# Patient Record
Sex: MALE | Race: WHITE | NOT HISPANIC OR LATINO | Employment: FULL TIME | URBAN - METROPOLITAN AREA
[De-identification: names, ages, dates, MRNs, and addresses within clinical notes are randomized per-mention and may not be internally consistent; named-entity substitution may affect disease eponyms.]

---

## 2017-10-05 ENCOUNTER — TELEPHONE (OUTPATIENT)
Dept: SPORTS MEDICINE | Facility: CLINIC | Age: 22
End: 2017-10-05

## 2017-10-05 NOTE — TELEPHONE ENCOUNTER
HIS FATHER CALL REQUESTING A COPY OF MINAL'S IMMUNIZATION RECORDS. I WILL HAVE RECORDS IN STORAGE SENT TO US TO BE ABLE TO UPDATE THEM IN EPIC

## 2017-10-10 ENCOUNTER — TELEPHONE (OUTPATIENT)
Dept: SPORTS MEDICINE | Facility: CLINIC | Age: 22
End: 2017-10-10

## 2017-10-10 NOTE — TELEPHONE ENCOUNTER
I called and left voice message informing the father we have no immunization records on this patient

## 2017-12-20 ENCOUNTER — OFFICE VISIT (OUTPATIENT)
Dept: SPORTS MEDICINE | Facility: CLINIC | Age: 22
End: 2017-12-20

## 2017-12-20 VITALS
HEIGHT: 67 IN | WEIGHT: 121 LBS | HEART RATE: 99 BPM | DIASTOLIC BLOOD PRESSURE: 82 MMHG | OXYGEN SATURATION: 98 % | BODY MASS INDEX: 18.99 KG/M2 | SYSTOLIC BLOOD PRESSURE: 122 MMHG

## 2017-12-20 DIAGNOSIS — Z00.00 PREVENTATIVE HEALTH CARE: Primary | ICD-10-CM

## 2017-12-20 DIAGNOSIS — S76.011A PSOAS MUSCLE STRAIN, RIGHT, INITIAL ENCOUNTER: ICD-10-CM

## 2017-12-20 PROCEDURE — 99395 PREV VISIT EST AGE 18-39: CPT | Performed by: FAMILY MEDICINE

## 2017-12-20 NOTE — PROGRESS NOTES
Chevy Samuel is here today for an annual physical exam.     Eating a healthy diet. Exercising routinely.     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  At the time of the injury patient was carrying folding chairs, walking forward, chairs resting mostly on the left anterior hip which caused a slight extension of the right hip.  No acute injury but was sore afterwards.  Has improved somewhat but there is still some discomfort in the right lower quadrant into the groin especially with lumbar spine extension or extension of the hip.    I have reviewed the patient's medical, family, and social history in detail and updated the computerized patient record.    Screening history:  Colonoscopy - n/a  Prostate - n/a  Metabolic - last year    Health Maintenance   Topic Date Due   • TDAP/TD VACCINES (1 - Tdap) 08/10/2012   • INFLUENZA VACCINE  08/01/2017       Review of Systems   Constitutional: Negative for activity change, appetite change, chills, diaphoresis, fatigue, fever and unexpected weight change.   HENT: Negative for congestion, ear pain, postnasal drip, rhinorrhea, sinus pressure, sneezing, sore throat and trouble swallowing.    Eyes: Negative for visual disturbance.   Respiratory: Negative for cough, chest tightness, shortness of breath and wheezing.    Cardiovascular: Negative for chest pain and palpitations.   Gastrointestinal: Negative for abdominal pain, blood in stool, nausea and vomiting.   Endocrine: Negative for cold intolerance, polydipsia, polyphagia and polyuria.   Genitourinary: Negative for dysuria, flank pain, frequency, hematuria and urgency.   Musculoskeletal: Negative for arthralgias, back pain, joint swelling and myalgias.        Per history of present illness   Skin: Negative for rash.   Allergic/Immunologic: Negative for environmental allergies.   Neurological: Negative for dizziness, syncope,  "weakness, numbness and headaches.   Hematological: Negative for adenopathy. Does not bruise/bleed easily.   Psychiatric/Behavioral: Negative for agitation, decreased concentration, dysphoric mood, sleep disturbance and suicidal ideas. The patient is not nervous/anxious.        /82  Pulse 99  Ht 170.2 cm (67\")  Wt 54.9 kg (121 lb)  SpO2 98%  BMI 18.95 kg/m2     Physical Exam    Vital signs reviewed.  General appearance: No acute distress  Eyes: conjunctiva clear without erythema; pupils equally round and reactive  ENT: external ears and nose normal; hearing normal, oropharynx clear  Neck: supple; no thyromegaly  CV: normal rate and rhythm; no peripheral edema  Respiratory: normal respiratory effort; lungs clear to auscultation bilaterally  MSK: normal gait and station; no focal joint deformity or swelling, right hip full painless range of motion however there is some discomfort in the right lower quadrant and groin region with resisted hip flexion.  Skin: no rash or wounds; normal turgor  Neuro: cranial nerves 2-12 grossly intact; normal sensation to light touch  Psych: mood and affect normal; recent and remote memory intact  Abdomen: Positive bowel sounds soft nontender nondistended.  Patient has some discomfort in the right lower quadrant into the groin with hip extension and resisted hip flexion.  No evidence of hernias.  No visits with results within 2 Week(s) from this visit.  Latest known visit with results is:    Hospital Outpatient Visit on 12/23/2016   Component Date Value Ref Range Status   • BSA 12/23/2016 1.7  m^2 Preliminary   • IVSd 12/23/2016 0.96  cm Preliminary   • LVIDd 12/23/2016 4.7  cm Preliminary   • LVIDs 12/23/2016 2.5  cm Preliminary   • LVPWd 12/23/2016 0.87  cm Preliminary   • IVS/LVPW 12/23/2016 1.1   Preliminary   • FS 12/23/2016 46.5  % Preliminary   • EDV(Teich) 12/23/2016 103.6  ml Preliminary   • ESV(Teich) 12/23/2016 23.0  ml Preliminary   • EF(Teich) 12/23/2016 77.8  % " Preliminary   • EF(cubed) 12/23/2016 84.7  % Preliminary   • LV mass(C)d 12/23/2016 147.2  grams Preliminary   • LV mass(C)dI 12/23/2016 87.4  grams/m^2 Preliminary   • SV(Teich) 12/23/2016 80.7  ml Preliminary   • SI(Teich) 12/23/2016 47.9  ml/m^2 Preliminary   • SV(cubed) 12/23/2016 89.3  ml Preliminary   • SI(cubed) 12/23/2016 53.0  ml/m^2 Preliminary   • Ao root diam 12/23/2016 2.8  cm Preliminary   • Ao root area 12/23/2016 6.3  cm^2 Preliminary   • ACS 12/23/2016 1.9  cm Preliminary   • LVOT diam 12/23/2016 2.0  cm Preliminary   • LVOT area 12/23/2016 3.1  cm^2 Preliminary   • LVOT area(traced) 12/23/2016 3.1  cm^2 Preliminary   • RVOT diam 12/23/2016 2.2  cm Preliminary   • RVOT area 12/23/2016 3.7  cm^2 Preliminary   • LVLd ap4 12/23/2016 6.5  cm Preliminary   • EDV(MOD-sp4) 12/23/2016 52.0  ml Preliminary   • LVLs ap4 12/23/2016 6.2  cm Preliminary   • ESV(MOD-sp4) 12/23/2016 20.0  ml Preliminary   • EF(MOD-sp4) 12/23/2016 61.5  % Preliminary   • LVLd ap2 12/23/2016 7.5  cm Preliminary   • EDV(MOD-sp2) 12/23/2016 60.0  ml Preliminary   • LVLs ap2 12/23/2016 6.7  cm Preliminary   • ESV(MOD-sp2) 12/23/2016 24.0  ml Preliminary   • EF(MOD-sp2) 12/23/2016 60.0  % Preliminary   • SV(MOD-sp4) 12/23/2016 32.0  ml Preliminary   • SI(MOD-sp4) 12/23/2016 19.0  ml/m^2 Preliminary   • SV(MOD-sp2) 12/23/2016 36.0  ml Preliminary   • SI(MOD-sp2) 12/23/2016 21.4  ml/m^2 Preliminary   • Ao root area (BSA corrected) 12/23/2016 1.7   Preliminary   • Ao root area (BSA corrected) 12/23/2016 60.0  ml/m^2 Preliminary   • CONTRAST EF 4CH 12/23/2016 61.5  ml/m^2 Preliminary   • LV Diastolic corrected for BSA 12/23/2016 30.9  ml/m^2 Preliminary   • LV Systolic corrected for BSA 12/23/2016 11.9  ml/m^2 Preliminary   • MV A dur 12/23/2016 0.1  sec Preliminary   • MV E max trevon 12/23/2016 96.5  cm/sec Preliminary   • MV A max trevon 12/23/2016 45.6  cm/sec Preliminary   • MV E/A 12/23/2016 2.1   Preliminary   • MV V2 max 12/23/2016 103.7   cm/sec Preliminary   • MV max PG 12/23/2016 4.3  mmHg Preliminary   • MV V2 mean 12/23/2016 68.2  cm/sec Preliminary   • MV mean PG 12/23/2016 2.1  mmHg Preliminary   • MV V2 VTI 12/23/2016 28.8  cm Preliminary   • MVA(VTI) 12/23/2016 2.2  cm^2 Preliminary   • MV P1/2t max trevon 12/23/2016 96.5  cm/sec Preliminary   • MV P1/2t 12/23/2016 48.2  msec Preliminary   • MVA(P1/2t) 12/23/2016 4.6  cm^2 Preliminary   • MV dec slope 12/23/2016 586.2  cm/sec^2 Preliminary   • MV dec time 12/23/2016 0.17  sec Preliminary   • Ao pk trevon 12/23/2016 115.4  cm/sec Preliminary   • Ao max PG 12/23/2016 5.3  mmHg Preliminary   • Ao max PG (full) 12/23/2016 1.2  mmHg Preliminary   • Ao V2 mean 12/23/2016 80.1  cm/sec Preliminary   • Ao mean PG 12/23/2016 3.0  mmHg Preliminary   • Ao mean PG (full) 12/23/2016 0.44  mmHg Preliminary   • Ao V2 VTI 12/23/2016 24.6  cm Preliminary   • LUCA(I,A) 12/23/2016 2.6  cm^2 Preliminary   • LUCA(I,D) 12/23/2016 2.6  cm^2 Preliminary   • LUCA(V,A) 12/23/2016 2.7  cm^2 Preliminary   • LUCA(V,D) 12/23/2016 2.7  cm^2 Preliminary   • LV V1 max PG 12/23/2016 4.2  mmHg Preliminary   • LV V1 mean PG 12/23/2016 2.5  mmHg Preliminary   • LV V1 max 12/23/2016 101.9  cm/sec Preliminary   • LV V1 mean 12/23/2016 74.1  cm/sec Preliminary   • LV V1 VTI 12/23/2016 20.4  cm Preliminary   • SV(Ao) 12/23/2016 155.8  ml Preliminary   • SI(Ao) 12/23/2016 92.5  ml/m^2 Preliminary   • SV(LVOT) 12/23/2016 62.6  ml Preliminary   • SV(RVOT) 12/23/2016 63.5  ml Preliminary   • SI(LVOT) 12/23/2016 37.2  ml/m^2 Preliminary   • PA V2 max 12/23/2016 131.7  cm/sec Preliminary   • PA max PG 12/23/2016 6.9  mmHg Preliminary   • PA max PG (full) 12/23/2016 4.8  mmHg Preliminary   • BH CV ECHO MODE - PVA(V,A) 12/23/2016 2.1  cm^2 Preliminary   • BH CV ECHO MODE - PVA(V,D) 12/23/2016 2.1  cm^2 Preliminary   • PI end-d trevon 12/23/2016 85.5  cm/sec Preliminary   • RV V1 max PG 12/23/2016 2.1  mmHg Preliminary   • RV V1 mean PG 12/23/2016 1.3   mmHg Preliminary   • RV V1 max 12/23/2016 73.2  cm/sec Preliminary   • RV V1 mean 12/23/2016 52.7  cm/sec Preliminary   • RV V1 VTI 12/23/2016 17.1  cm Preliminary   • TR max yoel 12/23/2016 156.1  cm/sec Preliminary   • Pulm Sys Yoel 12/23/2016 48.9  cm/sec Preliminary   • Pulm Salas Yoel 12/23/2016 74.7  cm/sec Preliminary   • Pulm S/D 12/23/2016 0.66   Preliminary   • Qp/Qs 12/23/2016 1.0   Preliminary   • Pulm A Revs Dur 12/23/2016 0.11  sec Preliminary   • Pulm A Revs Yoel 12/23/2016 37.6  cm/sec Preliminary   • MVA P1/2T LCG 12/23/2016 2.3  cm^2 Preliminary   • BH CV ECHO MODE - BZI_BMI 12/23/2016 20.4  kilograms/m^2 Preliminary   • BH CV ECHO MODE - BSA([facility]) 12/23/2016 1.7  m^2 Preliminary   • BH CV ECHO MODE - BZI_METRIC_WEIGHT 12/23/2016 59.0  kg Preliminary   • BH CV ECHO MODE - BZI_METRIC_HEIGHT 12/23/2016 170.2  cm Preliminary   • TDI S' 12/23/2016 14.00  cm/sec Final   • RV Base 12/23/2016 2.90  cm Final   • LA volume 12/23/2016 33.0  cm3 Final   • E/E' ratio 12/23/2016 6.0   Final   • LA Volume Index 12/23/2016 19.0  mL/m2 Final   • Lat Peak E' Yoel 12/23/2016 19.0  cm/sec Final   • Med Peak E' Yoel 12/23/2016 14.00  cm/sec Final   • RAP systole 12/23/2016 3  mmHg Final   • RVSP(TR) 12/23/2016 13  mmHg Final   • Sup Jose Ao Diam 12/23/2016 1.50  cm Final   • TAPSE (>1.6) 12/23/2016 2.50  cm2 Final        Chevy was seen today for annual exam.    Diagnoses and all orders for this visit:    Preventative health care  -     CBC & Differential  -     Comprehensive Metabolic Panel  -     Lipid Panel With / Chol / HDL Ratio  -     TSH  -     UA / M With / Rflx Culture([facility] ONLY) - Urine, Clean Catch    Psoas muscle strain, right, initial encounter    I have given him a set of home exercises for the iliopsoas strain, would recommend he try these for the next 4-6 weeks but if he is not seeing much significant improvement then he might seek out formal physical therapy at school.  Encourage healthy diet and  exercise.  Encourage patient to stay up to date on screening examinations as indicated based on age and risk factors.    EMR Dragon/Transcription disclaimer:    Much of this encounter note is an electronic transcription/translation of spoken language to printed text.  The electronic translation of spoken language may permit erroneous, or at times, nonsensical words or phrases to be inadvertently transcribed.  Although I have reviewed the note for such errors some may still exist.

## 2017-12-21 LAB
ALBUMIN SERPL-MCNC: 5.2 G/DL (ref 3.5–5.2)
ALBUMIN/GLOB SERPL: 1.6 G/DL
ALP SERPL-CCNC: 66 U/L (ref 39–117)
ALT SERPL-CCNC: 14 U/L (ref 1–41)
APPEARANCE UR: CLEAR
AST SERPL-CCNC: 19 U/L (ref 1–40)
BACTERIA #/AREA URNS HPF: NORMAL /HPF
BASOPHILS # BLD AUTO: 0.07 10*3/MM3 (ref 0–0.2)
BASOPHILS NFR BLD AUTO: 0.8 % (ref 0–1.5)
BILIRUB SERPL-MCNC: 0.6 MG/DL (ref 0.1–1.2)
BILIRUB UR QL STRIP: NEGATIVE
BUN SERPL-MCNC: 14 MG/DL (ref 6–20)
BUN/CREAT SERPL: 12.6 (ref 7–25)
CALCIUM SERPL-MCNC: 9.9 MG/DL (ref 8.6–10.5)
CHLORIDE SERPL-SCNC: 98 MMOL/L (ref 98–107)
CHOLEST SERPL-MCNC: 169 MG/DL (ref 0–200)
CHOLEST/HDLC SERPL: 2.45 {RATIO}
CO2 SERPL-SCNC: 29.6 MMOL/L (ref 22–29)
COLOR UR: YELLOW
CREAT SERPL-MCNC: 1.11 MG/DL (ref 0.76–1.27)
EOSINOPHIL # BLD AUTO: 0.07 10*3/MM3 (ref 0–0.7)
EOSINOPHIL NFR BLD AUTO: 0.8 % (ref 0.3–6.2)
EPI CELLS #/AREA URNS HPF: NORMAL /HPF
ERYTHROCYTE [DISTWIDTH] IN BLOOD BY AUTOMATED COUNT: 12.6 % (ref 11.5–14.5)
GLOBULIN SER CALC-MCNC: 3.2 GM/DL
GLUCOSE SERPL-MCNC: 91 MG/DL (ref 65–99)
GLUCOSE UR QL: NEGATIVE
HCT VFR BLD AUTO: 43.3 % (ref 40.4–52.2)
HDLC SERPL-MCNC: 69 MG/DL (ref 40–60)
HGB BLD-MCNC: 14.5 G/DL (ref 13.7–17.6)
HGB UR QL STRIP: NEGATIVE
IMM GRANULOCYTES # BLD: 0.03 10*3/MM3 (ref 0–0.03)
IMM GRANULOCYTES NFR BLD: 0.3 % (ref 0–0.5)
KETONES UR QL STRIP: NEGATIVE
LDLC SERPL CALC-MCNC: 87 MG/DL (ref 0–100)
LEUKOCYTE ESTERASE UR QL STRIP: NEGATIVE
LYMPHOCYTES # BLD AUTO: 1.09 10*3/MM3 (ref 0.9–4.8)
LYMPHOCYTES NFR BLD AUTO: 12.2 % (ref 19.6–45.3)
MCH RBC QN AUTO: 30.1 PG (ref 27–32.7)
MCHC RBC AUTO-ENTMCNC: 33.5 G/DL (ref 32.6–36.4)
MCV RBC AUTO: 90 FL (ref 79.8–96.2)
MICRO URNS: NORMAL
MICRO URNS: NORMAL
MONOCYTES # BLD AUTO: 0.99 10*3/MM3 (ref 0.2–1.2)
MONOCYTES NFR BLD AUTO: 11.1 % (ref 5–12)
MUCOUS THREADS URNS QL MICRO: PRESENT /HPF
NEUTROPHILS # BLD AUTO: 6.65 10*3/MM3 (ref 1.9–8.1)
NEUTROPHILS NFR BLD AUTO: 74.8 % (ref 42.7–76)
NITRITE UR QL STRIP: NEGATIVE
PH UR STRIP: 7 [PH] (ref 5–7.5)
PLATELET # BLD AUTO: 318 10*3/MM3 (ref 140–500)
POTASSIUM SERPL-SCNC: 4.6 MMOL/L (ref 3.5–5.2)
PROT SERPL-MCNC: 8.4 G/DL (ref 6–8.5)
PROT UR QL STRIP: NEGATIVE
RBC # BLD AUTO: 4.81 10*6/MM3 (ref 4.6–6)
RBC #/AREA URNS HPF: NORMAL /HPF
SODIUM SERPL-SCNC: 140 MMOL/L (ref 136–145)
SP GR UR: 1.01 (ref 1–1.03)
TRIGL SERPL-MCNC: 63 MG/DL (ref 0–150)
TSH SERPL DL<=0.005 MIU/L-ACNC: 3.49 MIU/ML (ref 0.27–4.2)
URINALYSIS REFLEX: NORMAL
UROBILINOGEN UR STRIP-MCNC: 0.2 MG/DL (ref 0.2–1)
VLDLC SERPL CALC-MCNC: 12.6 MG/DL (ref 5–40)
WBC # BLD AUTO: 8.9 10*3/MM3 (ref 4.5–10.7)
WBC #/AREA URNS HPF: NORMAL /HPF

## 2021-03-03 ENCOUNTER — IMMUNIZATION (OUTPATIENT)
Dept: VACCINE CLINIC | Facility: HOSPITAL | Age: 26
End: 2021-03-03

## 2021-03-03 PROCEDURE — 91300 HC SARSCOV02 VAC 30MCG/0.3ML IM: CPT | Performed by: THORACIC SURGERY (CARDIOTHORACIC VASCULAR SURGERY)

## 2021-03-03 PROCEDURE — 0001A: CPT | Performed by: THORACIC SURGERY (CARDIOTHORACIC VASCULAR SURGERY)

## 2021-03-24 ENCOUNTER — IMMUNIZATION (OUTPATIENT)
Dept: VACCINE CLINIC | Facility: HOSPITAL | Age: 26
End: 2021-03-24

## 2021-03-24 PROCEDURE — 91300 HC SARSCOV02 VAC 30MCG/0.3ML IM: CPT | Performed by: THORACIC SURGERY (CARDIOTHORACIC VASCULAR SURGERY)

## 2021-03-24 PROCEDURE — 0002A: CPT | Performed by: THORACIC SURGERY (CARDIOTHORACIC VASCULAR SURGERY)
